# Patient Record
(demographics unavailable — no encounter records)

---

## 2025-02-18 NOTE — HISTORY OF PRESENT ILLNESS
[LMP unknown] : LMP unknown [Y] : Patient is sexually active [N] : Patient denies prior pregnancies [Currently Active] : currently active [Men] : men [No] : No [Patient would like to be screened for STIs] : Patient would like to be screened for STIs [FreeTextEntry1] : 25 y/o pt presents in office today for a well women exam.  She also would like to get her AMH levels tested today.    Family Hx: Mother - blood cancer, MDS Mat aunt, blood cancer Pt has been tested before she went on birth and is negative   Meds: Lo loestrin   NKDA  [PapSmeardate] : 2024